# Patient Record
(demographics unavailable — no encounter records)

---

## 2025-03-18 NOTE — HISTORY OF PRESENT ILLNESS
[FreeTextEntry1] : The patient is a  years old, single, ,  man lives with , employed retired  The patient denies sx suggestive of major depression, stephanie, hypomania, psychosis, panic attacks, BEBE, OCD in the recent or remote past.   [FreeTextEntry2] : PAST PSYCHIATRIC HISTORY:  Onset:  Psych eval: Psychotherapy:  Hospitalizations:  Previous suicide attempts:  PAST SUBSTANCE ABUSE HISTORY:  Nicotine:  Alcohol: CAGE questionnaire: Negative First use: Last use:  Frequency:  Duration:  Blackouts: DUI: 	 DT  Cannabis:  First use: Last use:  Frequency:  Duration:   Other Illicit drugs:  First use: Last use:  Frequency:  Duration:   Medical/social/legal problems related to substance use:   Rehab tx:

## 2025-03-18 NOTE — PAST MEDICAL HISTORY
[FreeTextEntry1] : Past medical history:  Major medical problem: denies OTC medications: denies  TBI: denies Seizures: denies  Migraine HA: denies   Developmental History:  Pre/meera- problems: Unremarkable per patient knowledge Developmental milestones: unremarkable Infections: none per patient knowledge Febrile seizures: none per patient knowledge

## 2025-03-18 NOTE — FAMILY HISTORY
[FreeTextEntry1] : Psychiatric: none per patient knowledge Substance use: none per patient knowledge Suicide: none per patient knowledge  Neurological/cardiac/endocrine/cancer/autoimmune/other familial or hereditary disorders: negative per patient knowledge except

## 2025-03-18 NOTE — PLAN
[FreeTextEntry4] : I stop checked today: Reference #: [id number]  Practitioner Count: 1 Pharmacy Count: 1 Current Opioid Prescriptions: 0 Current Benzodiazepine Prescriptions: 0 Current Stimulant Prescriptions: 0   Patient Demographic Information (PDI)     PDI	First Name	Last Name	Birth Date	Gender	Street Address	[facility] Code AHSAN Seth	08/08/1942	Male	[address]	[facility]	[address]  Prescription Information    PDI Filter:   PDI	My Rx	Current Rx	Drug Type	Rx Written	Rx Dispensed	Drug	Quantity	Days Supply	Prescriber Name	Prescriber CHUY #	Payment Method	Dispenser A	N	Y		03/03/2025	03/04/2025	zolpidem tartrate 5 mg tablet	30	30	Prakash Manriquez	[id number]	Insurance	[facility] #[id number] A	N	N		09/11/2024	01/28/2025	zolpidem tartrate 5 mg tablet	30	30	Prakash Manriquez	[id number]	Insurance	[facility] #[id number] A	N	N		09/11/2024	12/22/2024	zolpidem tartrate 5 mg tablet	30	30	Prakash Manriquez	[id number]	Insurance	[facility] #[id number] A	N	N	B	12/02/2024	12/03/2024	clonazepam 1 mg tablet	60	30	Prakash Manriquez	[id number]	Insurance	[facility] #[id number] A	N	N	B	09/18/2024	09/19/2024	clonazepam 1 mg tablet	60	30	Prakash Manriquez	[id number]	Insurance	[facility] #[id number] A	N	N		09/11/2024	09/12/2024	zolpidem tartrate 5 mg tablet	30	30	Prakash Manriquez	[id number]	Insurance	[facility] #[id number] A	N	N	B	08/05/2024	08/06/2024	clonazepam 1 mg tablet	60	30	SchePrakash fischer	[id number]	Insurance	[facility] #[id number] A	N	N		01/26/2024	07/09/2024	zolpidem tartrate 5 mg tablet	30	30	Prakash Manriquez	[id number]	Insurance	[facility] #[id number] A	N	N	B	06/18/2024	06/19/2024	clonazepam 1 mg tablet	60	30	SchePrakash fischer	[id number]	Insurance	[facility] #[id number] A	N	N		01/26/2024	06/03/2024	zolpidem tartrate 5 mg tablet	30	30	Prakash Manriquez	[id number]	Insurance	[facility] #[id number] A	N	N		01/26/2024	05/02/2024	zolpidem tartrate 5 mg tablet	30	30	Prakash Manriquez	[id number]	Insurance	[facility] #[id number] A	N	N	B	04/25/2024	05/02/2024	clonazepam 0.5 mg tablet	30	30	Prakash Manriquez	[id number]	Insurance	[facility] #[id number] A	N	N		01/26/2024	03/29/2024	zolpidem tartrate 5 mg tablet	30	30	Prakash Manriquez	[id number]	Insurance	[facility] #[id number]

## 2025-03-18 NOTE — SOCIAL HISTORY
[FreeTextEntry1] : Born and grew up in Parents:  Mother:  Father: 	 Childhood: Relationship with Parents:  Siblings:	 Relationship with siblings: Education: Private/public school Average grades: Learning or behavior problems:  Graduated HS  College: Work hx:  Romantic relationships: Children: Trauma: Legal hx: Patient denies recent or remote hx of legal problems.  Access to firearms: patient denies.

## 2025-03-31 NOTE — RISK ASSESSMENT
[Clinical Interview] : Clinical Interview [No] : No [No known suicide factors] : No known suicide factors [Depressed mood/Anhedonia] : depressed mood/anhedonia [Global insomnia] : global insomnia [History of abuse/trauma] : history of abuse/trauma [None known] : None known [Identifies reasons for living] : identifies reasons for living [Positive therapeutic relationships] : positive therapeutic relationships [Responsibility to children, family, or others] : responsibility to children, family, or others [Engaged in work or school] : engaged in work or school [None in the patient's lifetime] : None in the patient's lifetime [None Known] : none known [No known risk factors] : No known risk factors

## 2025-03-31 NOTE — SOCIAL HISTORY
[FreeTextEntry1] : Pt states that he previously had friends, but that he does not make an effort to stay connected at this time. Pt has minimal socialization.

## 2025-03-31 NOTE — PSYCHOSOCIAL ASSESSMENT
[None known] : None known [Yes (select details below)] : Have you ever experienced this type of event? Yes [had nightmares about the event(s) or thought about the event(s) when you did not want] : did not have nightmares and/or unwanted thoughts about the events [tried hard not to think about the event(s) or went out of your way to avoid situations that reminded you of the event] : did not need to avoid thinking about events, did not need to avoid situations that might remind patient of events [has been constantly on guard, watchful, or easily startled] : has not been constantly on guard, watchful, or easily startled [felt numb or detached from people, activities, or your surrounding] : has not felt numb or detached from people, activities, or surroundings [felt guilty or unable to stop blaming yourself or others for the event(s) or any problems the event(s) may have caused] : has not felt guilty or unable to stop blaming self or others for event(s), or any problems the event(s) may have caused [Competitive and integrated employment] : Competitive and integrated employment [1 - 14 hours] : 1 - 14 hours [Retired] : retired [Financially stable] : financially stable [None] : none [Client's spouse or domestic partner] : client's spouse or domestic partner [Yes] : yes [No] : Patient has personal representation (legal guardian, representative payee, conservatorship)? No [FreeTextEntry1] : Pt works 2 nights per week as , but is retired NYELI

## 2025-03-31 NOTE — PLAN
[Admit to Program     (Add Program Admission information to a new column in the Admit/Discharge Flowsheet)] : Admit to program [Every ___ week(s)] : Psychotherapy: Every [unfilled] week(s) [Individual Therapy] : Individual Therapy [FreeTextEntry4] : Pt would benefit from strategies to increase motivation and to improve sleep hygiene.

## 2025-03-31 NOTE — DISCUSSION/SUMMARY
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 Marries 3x, currently lives with wife. Pt described feeling "down" and hopeless at times, which became acute when dx with cancer in late 2023. Pt socializes very little, and states that he is unmotivated and is unable to complete tasks other than going to work. Pt also states that he has great difficulty sleeping at night. Sees Dr. Ayala for medication-currently taking Mirtazepine.  Limited supportive relationships. [Low acute suicide risk] : Low acute suicide risk

## 2025-03-31 NOTE — FAMILY HISTORY
[FreeTextEntry1] : Pt is  with somewhat conflict-ridden relationship. Pt's sibling and parents are . Has adult children that Pt sees sporadically.

## 2025-03-31 NOTE — REASON FOR VISIT
[Number can be texted] : number can be texted [OK  to leave message] : OK  to leave message [FreeTextEntry5] : english [FreeTextEntry6] : arleth [Private Practice - Psychiatrist] : Private Practice - Psychiatrist [[facility] Provider/Facility] : [facility] Provider/Facility [Patient] : Patient [FreeTextEntry1] : Pt struggles with sleep disturbance and depressive symptoms.

## 2025-03-31 NOTE — HISTORY OF PRESENT ILLNESS
[Not Applicable] : Not applicable [FreeTextEntry1] : Pt states that feelings of sadness and being "down" became more pronounced since dx with 3 types of cancer in late 2023. Describes sleep disturbance and lack of motivation "can't get anything planned or completed". [FreeTextEntry2] : none

## 2025-04-17 NOTE — PLAN
[FreeTextEntry2] : Continue to work on strategies for building connection and reducing symptoms. [Acceptance and Commitment Therapy] : Acceptance and Commitment Therapy  [Cognitive and/or Behavior Therapy] : Cognitive and/or Behavior Therapy  [Psychodynamic Therapy] : Psychodynamic Therapy  [de-identified] :  Patient and provider met for follow-up psychotherapy session. Patient participated in a session face to face in office). Patient arrived on time and was dressed appropriately for the session. Mental status was within normal range. Patient did not endorse any suicidal or homicidal ideation, intent, or plan. Patient and provider checked-in about patient's mood. Pt states that he has felt stable in mood, but has great concern for his daughter, who struggles with ERICKA. Discussed with Pt. Pt was able to identify one friend that he has not connected with in a long time, that he might want to connect with. Discussed with Pt. [Return in ____ week(s)] : Return in [unfilled] week(s) [FreeTextEntry1] :  Patient will participate in individual psychotherapy for 45-60 minutes every two weeks.

## 2025-04-17 NOTE — END OF VISIT
[Individual Psychotherapy for 38-52 minutes] : Individual Psychotherapy for 38 - 52 minutes [Licensed Clinician] : Licensed Clinician No

## 2025-04-28 NOTE — PLAN
[Medication education provided] : Medication education provided. [Rationale for medication choices, possible risks/precautions, benefits, alternative treatment choices, and consequences of non-treatment discussed] : Rationale for medication choices, possible risks/precautions, benefits, alternative treatment choices, and consequences of non-treatment discussed with patient/family/caregiver  [FreeTextEntry4] : Psychotherapy documentation: Time spent for Psychotherapy: 18 minutes. Modality: Supportive psychotherapy and psychoeducation Goal: Reduction in symptoms of depression and anxiety. Reduce isolation. Increase activities that lift his mood.  Focus is session: Psychoeducation about medications and how they can help and role of behavior activation and finding and doing activities that lift his mood. Educate and encourage patient to maintain social connections and importance of doing so.  [FreeTextEntry5] : Psychoeducation provided: Reviewed with the patient rationale for increasing the dosage and the need to wait for noticing an incremental improvement of symptoms. The patient started attending individual psychotherapy sessions with the Nina Boyd and feels they have been productive, had only 2 sessions so far. Medications: The patient was advised to increase the dose of mirtazapine, and a prescription for this increase was issued.  Patient will increase mirtazapine dose to 22.5 mg at bedtime for 5 days then increase to 30 mg at bedtime.   Continue Clonazepam 0.5mg daily for now, with a plan to taper in the future. Mirtazapine side effects including but not limited to sedation, increased appetite reviewed with the patient today BZD side effects: Discussed with patient adverse effects of longer term/frequent use of BZD, potential to develop tolerance to the dose effects and develop dependence, and potential for addiction. Advise patient not to drive or operate heavy machinery immediately after taking the medication. Also educated patient about safe use/and keeping meds safely, and to not share medications with family/friends.  Educate patient on sleep hygiene and the importance of maintaining social connections. Emergency procedures were discussed: pt. educated to call 911 or go to nearest ER for worsening of symptoms/suicidal/homicidal ideation.  Continue individual psychotherapy to learn coping skills  RTC in 4-6 weeks or earlier as needed  Patient given opportunity to ask questions and their questions were answered and they expressed understanding and agreement with above plan.   I stop checked today: Reference #: [id number]  Practitioner Count: 1 Pharmacy Count: 1 Current Opioid Prescriptions: 0 Current Benzodiazepine Prescriptions: 0 Current Stimulant Prescriptions: 0   Patient Demographic Information (PDI)     PDI	First Name	Last Name	Birth Date	Gender	Street Address	[facility] Code AHSAN Seth	08/08/1942	Male	[address] [facility]	[address]  Prescription Information:  PDI	My Rx	Current Rx	Drug Type	Rx Written	Rx Dispensed	Drug	Quantity	Days Supply	Prescriber Name	Prescriber CHUY #	Payment Method	Dispenser A	N	N		03/03/2025	03/04/2025	zolpidem tartrate 5 mg tablet	30	30	Prakash Manriquez	[id number]	Insurance	[facility] #[id number] A	N	N		09/11/2024	01/28/2025	zolpidem tartrate 5 mg tablet	30	30	Prakash Manriquez	[id number]	[facility] #[id number] A	N	N		09/11/2024	12/22/2024	zolpidem tartrate 5 mg tablet	30	30	Scheinbach, Prakash	[id number]	Insurance	[facility] #[id number] A	N	N	B	12/02/2024	12/03/2024	clonazepam 1 mg tablet	60	30	Scheinbach, Prakash	[id number]	Insurance	[facility] #[id number] A	N	N	B	09/18/2024	09/19/2024	clonazepam 1 mg tablet	60	30	Scheinbach, Prakash	[id number]	[facility] #[id number] A	N	N		09/11/2024	09/12/2024	zolpidem tartrate 5 mg tablet	30	30	Scheinbach, Prakash	[id number]	[facility] #[id number] A	N	N	B	08/05/2024	08/06/2024	clonazepam 1 mg tablet	60	30	Scheinbach, Prakash	[id number]	[facility] #[id number] A	N	N		01/26/2024	07/09/2024	zolpidem tartrate 5 mg tablet	30	30	Scheinbach, Prakash	[id number]	[facility] #[id number] A	N	N	B	06/18/2024	06/19/2024	clonazepam 1 mg tablet	60	30	Scheinbach, Prakash	[id number]	[facility] #[id number] A	N	N		01/26/2024	06/03/2024	zolpidem tartrate 5 mg tablet	30	30	Scheinbach, Prakash	[id number]	[facility] #[id number] A	N	N		01/26/2024	05/02/2024	zolpidem tartrate 5 mg tablet	30	30	Scheinbach, Prakash	[id number]	[facility] #[id number] A	N	N	B	04/25/2024	05/02/2024	clonazepam 0.5 mg tablet	30	30	Scheinbach, Prakash	[id number]	[facility] #[id number]  I spent a total of 30 minutes for today's visit in evaluating and treating the patient as per above, including: preparing for patient's appointment (review of prior documents, [facility] ), performing a medically necessary exam/evaluation, communicating/counseling/educating the patient ordering medications, documenting clinical information in the EHR

## 2025-04-28 NOTE — HISTORY OF PRESENT ILLNESS
[FreeTextEntry1] :  Med changes on last visit: Was not effective so was tapered and stopped and patient was started on mirtazapine. The patient states that he is sleeping a little bit better after switching to mirtazapine.  Patient states he has been taking clonazepam about an hour before bedtime and taking the mirtazapine at bedtime, able to fall asleep easily and not "wait an hour or more" and that he is sleeping from 11 PM to around 5 PM uninterrupted and then he wakes up, tosses and turns in and about 45 minutes to an hour he is able to fall back to sleep and wakes up around 9 or 10 AM in the morning.  He has been taking clonazepam about an hour before bed and reported improved sleep as a result. The patient described an overall improvement in mood, although some depressive symptoms continue to persist.  He states he started to notice "cloudy slowly lifting" The patient did not report any excessive worry, ruminations, or physical symptoms of anxiety during this session. Patient reports that he only eats when hungry, leading to irregular eating times and a couple meals a day.  Patient states that he has not last any more weight, but his weight tends to still fluctuate between 166 to 170 pounds The patient noted low energy levels, although he is starting to think about things he wants to do and reports a slight improvement in his motivation levels.  No hallucinations or delusions reported during the appointment. Patient denied any passive or active suicidal ideation. Side effects from medications: The patient reported no side effects from his current medications. Adherence to medication: The patient has been adhering well to his medication regimen. Substance use history: The patient denied any use of alcohol, cannabis, or illicit drugs since the last visit. Medical update: The patient mentioned that he had received some recent cardiac testing which yielded good results. He is scheduled for an endoscopy due to ongoing stomach issues.  Patient states that he is not actively experiencing any symptoms from his cancer other than fatigue Psychosocial history: The patient reported withdrawing from social activities and contacts since becoming ill. He also mentioned potentially attending a car show next weekend but this conflicts with the family engagement so he would have to cancel.

## 2025-04-28 NOTE — DISCUSSION/SUMMARY
[FreeTextEntry1] : The patient is an 82-year-old male with a recent diagnosis of multiple cancers related to 9/11 exposure. He presents with symptoms of depression, including low mood, lack of motivation, social withdrawal, and sleep disturbances. He also reports anxiety and trauma-related symptoms. 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 He has been using Clonazepam and Ambien to manage his symptoms, with limited effectiveness. The patient's presentation is consistent with a major depressive episode, likely precipitated by his recent cancer diagnosis and exacerbated by his history of trauma exposure. His sleep disturbances and anxiety symptoms appear to be intertwined with his depressive symptoms.  4/28/2025: The patient reports improved mood and sleep and reduced anxiety, no adverse effects from mirtazapine and rates his symptoms of depression and anxiety at 4-5/10, 10 being the worst and feels that there is about 40% improvement of symptoms in the past 6 weeks, after the recent medication changes.

## 2025-04-28 NOTE — PHYSICAL EXAM
[None] : none [Average] : average [Cooperative] : cooperative [Clear] : clear [Linear/Goal Directed] : linear/goal directed [None Reported] : none reported [WNL] : within normal limits [FreeTextEntry8] : "Okay"  [de-identified] : less constricted  [FreeTextEntry7] : No SI/HI

## 2025-05-08 NOTE — PLAN
[FreeTextEntry2] : Continue to work on strategies for increasing motivation and decreasing isolation. [Acceptance and Commitment Therapy] : Acceptance and Commitment Therapy  [Cognitive and/or Behavior Therapy] : Cognitive and/or Behavior Therapy  [Psychodynamic Therapy] : Psychodynamic Therapy  [de-identified] :  Patient and provider met for follow-up psychotherapy session. Patient participated in a ~  ~-minute session face to face in office. Patient arrived on time and was dressed appropriately for the session. Mental status was within normal range. Patient did not endorse any suicidal or homicidal ideation, intent, or plan. Patient and provider checked-in about patient's mood. Pt states that since SSRI was increased in dosage, he has been feeling better and more positive. Pt revealed that he reached out to an old friend and is thinking of planning a lunch date with a few more friends. Discussed startegy for increasing motivation to accomplish tasks around the yard. [Return in ____ week(s)] : Return in [unfilled] week(s) [FreeTextEntry1] :  Patient will participate in individual psychotherapy for 45-60 minutes every 2 weeks

## 2025-05-12 NOTE — PHYSICAL EXAM
[Neck Appearance] : the appearance of the neck was normal [Neck Cervical Mass (___cm)] : no neck mass was observed [] : no respiratory distress [Respiration, Rhythm And Depth] : normal respiratory rhythm and effort [Auscultation Breath Sounds / Voice Sounds] : lungs were clear to auscultation bilaterally [Apical Impulse] : the apical impulse was normal [Heart Rate And Rhythm] : heart rate was normal and rhythm regular [Heart Sounds] : normal S1 and S2 [Edema] : there was no peripheral edema [Bowel Sounds] : normal bowel sounds [Abdomen Soft] : soft [Abdomen Tenderness] : non-tender [Abnormal Walk] : normal gait [Skin Color & Pigmentation] : normal skin color and pigmentation [Oriented To Time, Place, And Person] : oriented to person, place, and time [FreeTextEntry1] : soft mobile lump

## 2025-05-12 NOTE — ASSESSMENT
[FreeTextEntry1] : hairy cell leukemia, Multiple Myeloma, B-Cell-non Hodgkin's lymphoma-follicular lymphoma -cont care with [facility] -auth on file  -CM DT  -will call if symptoms return sooner    Lump patient to follow up with PMD suspecting lipoma if concern for malignancy wtchp can cover diagnostic workup

## 2025-05-12 NOTE — REVIEW OF SYSTEMS
[Loss Of Hearing] : hearing loss [Shortness Of Breath] : shortness of breath [Cough] : cough [Wheezing] : wheezing [SOB on Exertion] : shortness of breath during exertion [Heartburn] : heartburn [Joint Pain] : joint pain [Earache] : no earache [Skin Lesions] : no skin lesions [Suicidal] : not suicidal [Anxiety] : no anxiety [Depression] : no depression

## 2025-05-12 NOTE — DISCUSSION/SUMMARY
[FreeTextEntry3] : HPI   81 y M presents to [facility] for his  2nd annual health exam .  patient is certified for hairy cell leukemia, Multiple Myeloma, B-Cell-non Hodgkin's lymphoma-follicular lymphoma    non [facility] related conditions: PMHx CAD(s/p stenet x 2019), HTN, HLD, insomnia, hyperparathyroidism (s/p parathyroidectomy), BPH (s/p TURP)  PCP: Dr. Prakash Horn  ONC Dr. 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 Pt was in an area contaminated with high levels of dust (Tier 1)   PMH/PSH:  CAD(s/p stenet x 2019), HTN, HLD, insomnia, hyperparathyroidism (s/p parathyroidectomy), BPH (s/p TURP) Fam Hx: none Allergies: NKDA Meds: see above Soc Hx: wife Huma  Smoking Status: never    Review of Systems-IAMQ reviewed with patient  PE:  in trial DB     Plan: -CBC, CMP, lipids, UA ordered -Imaging: chest imaging done recently with oncologist PET scan  -Spirometry:  ordered today and reviewed with patient  -indications for colon cancer screening discussed:  explained to patient: The USPSTF recommends screening for colorectal cancer in adults aged 45 to 75 years. -Influenza Vaccine reported up to date -blood pressure management discussed, pt aware of untreated blood pressure side effects such as stroke and death, medication compliance discussed pt to follow up with PMD for further management -RT 1 year

## 2025-05-12 NOTE — PAST MEDICAL HISTORY
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

## 2025-05-12 NOTE — PAST MEDICAL HISTORY
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

## 2025-05-12 NOTE — HISTORY OF PRESENT ILLNESS
[FreeTextEntry1] : HPI 81 y M presents to [facility] for his follow up   patient is certified for hairy cell leukemia, Multiple Myeloma, B-Cell-non Hodgkin's lymphoma-follicular lymphoma He follows with Dr. Martin oncology at [facility] has an appt tomorrow - 5/13/2025 no taste for food  irregular sleeping takes medication  using bathroom normal  coughing at night not associated with eating since pna   prev hx: Path -10/22/2023 Pt recently diagnosed with hairy cell leukemia on bone marrow biopsy (80%) s/p cladribine OCT 23 2023 Multiple Myeloma 10% of bone marrow biopsy- see flow cytometry Path -10/20/2023 B-Cell-non Hodgkin's lymphoma-follicular lymphoma with chest adenopathy and right pleural effusion. S/P thoracentesis  He follows with Dr. Ashu Moore s/p PET scan 1/2025   non [facility] related conditions: PMHx CAD(s/p stenet x 2019), HTN, HLD, insomnia, hyperparathyroidism (s/p parathyroidectomy), BPH (s/p TURP)   He reports to have a fatty growth on his back that was evaulated by hemeonc

## 2025-05-12 NOTE — DISCUSSION/SUMMARY
[FreeTextEntry3] : HPI   81 y M presents to [facility] for his  2nd annual health exam .  patient is certified for hairy cell leukemia, Multiple Myeloma, B-Cell-non Hodgkin's lymphoma-follicular lymphoma    non [facility] related conditions: PMHx CAD(s/p stenet x 2019), HTN, HLD, insomnia, hyperparathyroidism (s/p parathyroidectomy), BPH (s/p TURP)  PCP: Dr. Prakash Horn  ONC Dr. 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 Pt was in an area contaminated with high levels of dust (Tier 1)   PMH/PSH:  CAD(s/p stenet x 2019), HTN, HLD, insomnia, hyperparathyroidism (s/p parathyroidectomy), BPH (s/p TURP) Fam Hx: none Allergies: NKDA Meds: see above Soc Hx: wife Huma  Smoking Status: never    Review of Systems-IAMQ reviewed with patient  PE:  in trial DB     Plan: -CBC, CMP, lipids, UA ordered -Imaging: chest imaging done recently with oncologist PET scan  -Spirometry:  ordered today and reviewed with patient  -indications for colon cancer screening discussed:  explained to patient: The USPSTF recommends screening for colorectal cancer in adults aged 45 to 75 years. -Influenza Vaccine reported up to date -blood pressure management discussed, pt aware of untreated blood pressure side effects such as stroke and death, medication compliance discussed pt to follow up with PMD for further management -RT 1 year      C/S

## 2025-05-12 NOTE — ASSESSMENT
[FreeTextEntry1] : hairy cell leukemia, Multiple Myeloma, B-Cell-non Hodgkin's lymphoma-follicular lymphoma -cont care with [facility] -auth on file  -CM DT  -will call if symptoms return sooner    Lump patient to follow up with PMD suspecting lipoma if concern for malignancy wtchp can cover diagnostic workup

## 2025-05-12 NOTE — HISTORY OF PRESENT ILLNESS
[FreeTextEntry1] : HPI 81 y M presents to [facility] for his follow up   patient is certified for hairy cell leukemia, Multiple Myeloma, B-Cell-non Hodgkin's lymphoma-follicular lymphoma He follows with Dr. Martin oncology at [facility] has an appt tomorrow - 5/13/2025 no taste for food  irregular sleeping takes medication  using bathroom normal  coughing at night not associated with eating since pna   prev hx: Path -10/22/2023 Pt recently diagnosed with hairy cell leukemia on bone marrow biopsy (80%) s/p cladribine OCT 23 2023 Multiple Myeloma 10% of bone marrow biopsy- see flow cytometry Path -10/20/2023 B-Cell-non Hodgkin's lymphoma-follicular lymphoma with chest adenopathy and right pleural effusion. S/P thoracentesis  He follows with Dr. Ashu Moore s/p PET scan 1/2025   non Health system related conditions: PMHx CAD(s/p stenet x 2019), HTN, HLD, insomnia, hyperparathyroidism (s/p parathyroidectomy), BPH (s/p TURP)   He reports to have a fatty growth on his back that was evaulated by hemeonc

## 2025-05-28 NOTE — PLAN
[FreeTextEntry2] : Continue to work on strategies for reducing symptoms and increasing motivation and socialization. [Acceptance and Commitment Therapy] : Acceptance and Commitment Therapy  [Cognitive and/or Behavior Therapy] : Cognitive and/or Behavior Therapy  [Psychodynamic Therapy] : Psychodynamic Therapy  [de-identified] :  Patient and provider met for follow-up psychotherapy session. Patient participated in a  session face to face in office. Patient arrived on time and was dressed appropriately for the session. Mental status was within normal range. Patient did not endorse any suicidal or homicidal ideation, intent, or plan. Patient and provider checked-in about patient's mood. Pt states that he has been feeling less motivation and more depression over the past week, along with continued sleep disturbance. Discussed ways for Pt to increase socialization by reaching out to some old friends via facebook. [Return in ____ week(s)] : Return in [unfilled] week(s) [FreeTextEntry1] :  Patient will participate in individual psychotherapy for 45-60 minutes every two weeks.

## 2025-05-28 NOTE — REASON FOR VISIT
[Patient] : Patient [FreeTextEntry1] : Pt struggles with depressive symptoms and adjustment to aging.

## 2025-06-03 NOTE — PLAN
[FreeTextEntry1] : Progressive enlarging and now symptomatic left upper back lesion.    Given the associated discomfort or pain, limitations in activity, difficulty with incidental contact or minor trauma, the progression in size while under observation and the ultimately unknown or unclear underlying pathology as well as the patient's understandable anxiety, consideration of operative intervention does seem indicated and appropriate.    The risks, benefits and nature of the operative intervention have been reviewed with Mr. ZAVALA at length, including but not limited to the approximate location and size of the expected or typical operative incision, the probability of a visible post operative scar or local skin changes and the possibilities of bleeding, infection, seroma, lymphedema or future lesion recurrence.     Mr. ZAVALA and his wife aware that future recommendations or procedures may be required pending the operative findings and the final Pathology report.    They demonstrate good insight and remains eager to proceed.    While there are further specific concerns presently, I have encouraged them to follow-up with me and my office at any time in the interim to discuss any future questions.    Our plan will be for an elective outpatient procedure following any standard pre-surgical testing with medical and Cardiology clearance as required by the appropriate facility.  Given the size of the lesion, will obtain pre-operative dedicated soft tissue ultrasound to rule out concerning Radiographic features.  Please note that more than 50% of face-to-face time was spent in counseling and coordination of care.

## 2025-06-03 NOTE — HISTORY OF PRESENT ILLNESS
[de-identified] : Exceptionally pleasant gentleman presenting to office in company of wife under direction of referring Hematologist Oncologist. Mr. Seth reports a progressive soft tissue fullness over the left upper back for "at least of couple of years." With this, he states that is also a progressive sensation of "awareness... pressure... discomfort..." including ramón pain with incidental contact. No imaging has been done to date regarding this issue.

## 2025-06-03 NOTE — PHYSICAL EXAM
[Respiratory Effort] : normal respiratory effort [Normal Rate and Rhythm] : normal rate and rhythm [No HSM] : no hepatosplenomegaly [Tender] : was nontender [Enlarged] : not enlarged [Alert] : alert [Oriented to Person] : oriented to person [Oriented to Place] : oriented to place [Oriented to Time] : oriented to time [Anxious] : anxious [de-identified] : Appears well, no acute distress, ambulates easily into office and assumes examination table without need of assistance.  [de-identified] : Normocephalic and atraumatic.  [de-identified] : Supple with full range of motion.  [FreeTextEntry1] : No cervical, supraclavicular, axillary or inguinal adenopathy.  [de-identified] : Deferred.  [de-identified] : Soft and non-tense and non-tender. [de-identified] : Deferred.  [de-identified] : Deferred.  [de-identified] : Grossly symmetric and within normal limits without motor or sensory deficits.  [de-identified] : Soft, rubbery, semi-mobile soft tissue fullness ~ 10 cm in size over left trapezius/ clavicular area without overlying skin change. [de-identified] : but quite appropriately overall and pleasant/ interactive/ appreciative.

## 2025-06-03 NOTE — REVIEW OF SYSTEMS
[Feeling Poorly] : not feeling poorly [Feeling Tired] : not feeling tired [Cough] : cough [Joint Stiffness] : joint stiffness [As Noted in HPI] : as noted in HPI [Anxiety] : anxiety [Depression] : no depression [Negative] : Heme/Lymph [de-identified] : regarding this issue and visit.